# Patient Record
Sex: FEMALE | Race: WHITE | NOT HISPANIC OR LATINO
[De-identification: names, ages, dates, MRNs, and addresses within clinical notes are randomized per-mention and may not be internally consistent; named-entity substitution may affect disease eponyms.]

---

## 2019-10-10 ENCOUNTER — FORM ENCOUNTER (OUTPATIENT)
Age: 69
End: 2019-10-10

## 2020-04-23 ENCOUNTER — FORM ENCOUNTER (OUTPATIENT)
Age: 70
End: 2020-04-23

## 2020-10-15 ENCOUNTER — FORM ENCOUNTER (OUTPATIENT)
Age: 70
End: 2020-10-15

## 2021-02-20 PROBLEM — Z00.00 ENCOUNTER FOR PREVENTIVE HEALTH EXAMINATION: Status: ACTIVE | Noted: 2021-02-20

## 2021-04-22 DIAGNOSIS — Z86.79 PERSONAL HISTORY OF OTHER DISEASES OF THE CIRCULATORY SYSTEM: ICD-10-CM

## 2021-04-22 DIAGNOSIS — Z87.891 PERSONAL HISTORY OF NICOTINE DEPENDENCE: ICD-10-CM

## 2021-04-22 DIAGNOSIS — Z80.8 FAMILY HISTORY OF MALIGNANT NEOPLASM OF OTHER ORGANS OR SYSTEMS: ICD-10-CM

## 2021-04-22 DIAGNOSIS — Z86.39 PERSONAL HISTORY OF OTHER ENDOCRINE, NUTRITIONAL AND METABOLIC DISEASE: ICD-10-CM

## 2021-04-22 DIAGNOSIS — Z85.3 PERSONAL HISTORY OF MALIGNANT NEOPLASM OF BREAST: ICD-10-CM

## 2021-04-22 DIAGNOSIS — Z80.1 FAMILY HISTORY OF MALIGNANT NEOPLASM OF TRACHEA, BRONCHUS AND LUNG: ICD-10-CM

## 2021-04-23 ENCOUNTER — APPOINTMENT (OUTPATIENT)
Dept: BREAST CENTER | Facility: CLINIC | Age: 71
End: 2021-04-23
Payer: MEDICARE

## 2021-04-23 VITALS
HEIGHT: 58 IN | SYSTOLIC BLOOD PRESSURE: 172 MMHG | BODY MASS INDEX: 35.68 KG/M2 | DIASTOLIC BLOOD PRESSURE: 94 MMHG | WEIGHT: 170 LBS

## 2021-04-23 PROCEDURE — 99214 OFFICE O/P EST MOD 30 MIN: CPT

## 2021-04-23 RX ORDER — METFORMIN HYDROCHLORIDE 500 MG/1
500 TABLET, COATED ORAL
Refills: 0 | Status: ACTIVE | COMMUNITY

## 2021-04-23 RX ORDER — EMPAGLIFLOZIN 25 MG/1
25 TABLET, FILM COATED ORAL
Refills: 0 | Status: ACTIVE | COMMUNITY

## 2021-04-23 RX ORDER — EZETIMIBE 10 MG/1
10 TABLET ORAL
Refills: 0 | Status: ACTIVE | COMMUNITY

## 2021-04-23 RX ORDER — METOPROLOL TARTRATE 100 MG/1
100 TABLET, FILM COATED ORAL
Refills: 0 | Status: ACTIVE | COMMUNITY

## 2021-04-23 RX ORDER — ROSUVASTATIN CALCIUM 40 MG/1
40 TABLET, FILM COATED ORAL
Refills: 0 | Status: ACTIVE | COMMUNITY

## 2021-04-23 RX ORDER — LOSARTAN POTASSIUM 100 MG/1
100 TABLET, FILM COATED ORAL
Refills: 0 | Status: ACTIVE | COMMUNITY

## 2021-04-23 NOTE — HISTORY OF PRESENT ILLNESS
[FreeTextEntry1] : The patient is a 71-year-old G3, P2 postmenopausal white female of [place] descent.  She underwent menopause at age 57 and never took any hormone replacement therapy.  She underwent menarche at age 12 and had her first child at age 19.  She has a family history with her maternal aunts with breast cancer at age 70 and her father had brain and lung cancer at age 52.  The patient felt a mass in the right breast upper inner quadrant in October 2017 and underwent a mammography and ultrasound showing a suspicious spiculated mass measuring about 1.2 cm in the right breast 1:00 region 8 cm from the nipple.  She underwent an ultrasound-guided core biopsy on October 24, 2017 at [facility] which showed a 1 cm high-grade infiltrating ductal cancer which was ER positive at 90%, WV positive at 50%, HER-2/elissa negative by IHC with a Ki-67 of 20%.  She underwent a bilateral breast MRI on November 8, 2017 which showed a localized right breast cancer.  She underwent a right breast partial mastectomy and sentinel lymph node biopsy on December 11, 2017 and the final pathology showed 5 negative sentinel lymph nodes and the wide excision showed a 1.5 cm high-grade infiltrating ductal cancer with negative margins.  This was a pathologic prognostic stage IA breast cancer.  MammaPrint showed a high risk luminal type and she underwent AC-T chemotherapy with Dr. Stack which finished in May 2018.  She received external beam radiation which finished in July 2018 and was started on Arimidex in August 2018.  She comes in for routine follow-up and continues to get yearly mammography and ultrasound.

## 2021-04-23 NOTE — REASON FOR VISIT
[Follow-Up: _____] : a [unfilled] follow-up visit [FreeTextEntry1] : The patient comes in with a family history of breast cancer and a personal history of a right breast upper inner quadrant high-grade invasive duct cancer which was ER/OH positive HER-2/elissa negative for which she underwent a right breast partial mastectomy on December 11, 2017 and final pathology showed 5 negative sentinel lymph nodes with a 1.5 cm high-grade infiltrating ductal cancer.  This was a pathologic prognostic stage IA breast cancer.  MammaPrint showed a high risk luminal type and she underwent chemotherapy with Dr. Stack and received external beam radiation which finished in July 2018 and was started on Arimidex.

## 2021-04-23 NOTE — ASSESSMENT
[FreeTextEntry1] : The patient is a 71-year-old G3, P2 postmenopausal white female of [place] descent.  She underwent menopause at age 57 and never took any hormone replacement therapy.  She underwent menarche at age 12 and had her first child at age 19.  She has a family history with her maternal aunts with breast cancer at age 70 and her father had brain and lung cancer at age 52.  The patient felt a mass in the right breast upper inner quadrant in October 2017 and underwent a mammography and ultrasound showing a suspicious spiculated mass measuring about 1.2 cm in the right breast 1:00 region 8 cm from the nipple.  She underwent an ultrasound-guided core biopsy on October 24, 2017 at [facility] which showed a 1 cm high-grade infiltrating ductal cancer which was ER positive at 90%, NY positive at 50%, HER-2/elissa negative by IHC with a Ki-67 of 20%.  She underwent a bilateral breast MRI on November 8, 2017 which showed a localized right breast cancer.  She underwent a right breast partial mastectomy and sentinel lymph node biopsy on December 11, 2017 and the final pathology showed 5 negative sentinel lymph nodes and the wide excision showed a 1.5 cm high-grade infiltrating ductal cancer with negative margins.  This was a pathologic prognostic stage IA breast cancer.  MammaPrint showed a high risk luminal type and she underwent AC-T chemotherapy with Dr. Stack which finished in May 2018.  She received external beam radiation which finished in July 2018 and was started on Arimidex in August 2018.  On exam today she has no evidence of recurrence in the right breast and no suspicious findings felt in the left breast.  She underwent a bilateral mammography and ultrasound at [facility] on April 21, 2020 just showing postop changes to the right breast.  The patient was reassured and should follow-up again in 6 months in October 2021.  Her next bilateral mammography and ultrasound will be due in April 2022.  She will remain on Arimidex and follow-up with Dr. Walker.

## 2021-04-23 NOTE — PHYSICAL EXAM
[Normocephalic] : normocephalic [Atraumatic] : atraumatic [EOMI] : extra ocular movement intact [Supple] : supple [No Supraclavicular Adenopathy] : no supraclavicular adenopathy [No Cervical Adenopathy] : no cervical adenopathy [Examined in the supine and seated position] : examined in the supine and seated position [No dominant masses] : no dominant masses in right breast  [No dominant masses] : no dominant masses left breast [No Nipple Retraction] : no left nipple retraction [No Nipple Discharge] : no left nipple discharge [Breast Mass Right Breast ___cm] : no masses [Breast Mass Left Breast ___cm] : no masses [Breast Nipple Inversion] : nipples not inverted [Breast Nipple Retraction] : nipples not retracted [Breast Nipple Flattening] : nipples not flattened [Breast Nipple Fissures] : nipples not fissured [Breast Abnormal Lactation (Galactorrhea)] : no galactorrhea [Breast Abnormal Secretion Bloody Fluid] : no bloody discharge [Breast Abnormal Secretion Serous Fluid] : no serous discharge [Breast Abnormal Secretion Opalescent Fluid] : no milky discharge [No Axillary Lymphadenopathy] : no left axillary lymphadenopathy [No Edema] : no edema [No Rashes] : no rashes [No Ulceration] : no ulceration [de-identified] : Status post right breast partial mastectomy with scarring changes in the right breast 12:00 region but no evidence of recurrence. [de-identified] : On exam, the patient has ptotic C-cup breasts.  On palpation she has typical scarring changes toward the upper aspect of the right breast where she had her partial mastectomy but no evidence of recurrence.  She has no axillary, supraclavicular, or cervical adenopathy.

## 2021-04-23 NOTE — PAST MEDICAL HISTORY
[Postmenopausal] : The patient is postmenopausal [Menarche Age ____] : age at menarche was [unfilled] [Menopause Age____] : age at menopause was [unfilled] [Total Preg ___] : G[unfilled] [Live Births ___] : P[unfilled]  [Age At Live Birth ___] : Age at live birth: [unfilled] [History of Hormone Replacement Treatment] : has no history of hormone replacement treatment [de-identified] : bra size 42DD

## 2021-10-18 ENCOUNTER — APPOINTMENT (OUTPATIENT)
Dept: BREAST CENTER | Facility: CLINIC | Age: 71
End: 2021-10-18
Payer: MEDICARE

## 2021-10-18 VITALS — BODY MASS INDEX: 35.68 KG/M2 | HEIGHT: 58 IN | WEIGHT: 170 LBS

## 2021-10-18 PROCEDURE — 99213 OFFICE O/P EST LOW 20 MIN: CPT

## 2021-10-18 NOTE — HISTORY OF PRESENT ILLNESS
[FreeTextEntry1] : The patient is a 71-year-old G3, P2 postmenopausal white female of [place] descent.  She underwent menopause at age 57 and never took any hormone replacement therapy.  She underwent menarche at age 12 and had her first child at age 19.  She has a family history with her maternal aunts with breast cancer at age 70 and her father had brain and lung cancer at age 52.  The patient felt a mass in the right breast upper inner quadrant in October 2017 and underwent a mammography and ultrasound showing a suspicious spiculated mass measuring about 1.2 cm in the right breast 1:00 region 8 cm from the nipple.  She underwent an ultrasound-guided core biopsy on October 24, 2017 at [facility] which showed a 1 cm high-grade infiltrating ductal cancer which was ER positive at 90%, WI positive at 50%, HER-2/elissa negative by IHC with a Ki-67 of 20%.  She underwent a bilateral breast MRI on November 8, 2017 which showed a localized right breast cancer.  She underwent a right breast partial mastectomy and sentinel lymph node biopsy on December 11, 2017 and the final pathology showed 5 negative sentinel lymph nodes and the wide excision showed a 1.5 cm high-grade infiltrating ductal cancer with negative margins.  This was a pathologic prognostic stage IA breast cancer.  MammaPrint showed a high risk luminal type and she underwent AC-T chemotherapy with Dr. Stack which finished in May 2018.  She received external beam radiation which finished in July 2018 and was started on Arimidex in August 2018.  She comes in for routine follow-up and continues to get yearly mammography and ultrasound.

## 2021-10-18 NOTE — REASON FOR VISIT
[Follow-Up: _____] : a [unfilled] follow-up visit [FreeTextEntry1] : The patient comes in with a family history of breast cancer and a personal history of a right breast upper inner quadrant high-grade invasive duct cancer which was ER/MS positive HER-2/elissa negative for which she underwent a right breast partial mastectomy on December 11, 2017 and final pathology showed 5 negative sentinel lymph nodes with a 1.5 cm high-grade infiltrating ductal cancer.  This was a pathologic prognostic stage IA breast cancer.  MammaPrint showed a high risk luminal type and she underwent chemotherapy with Dr. Stack and received external beam radiation which finished in July 2018 and was started on Arimidex.

## 2021-10-18 NOTE — PHYSICAL EXAM
[Normocephalic] : normocephalic [Atraumatic] : atraumatic [EOMI] : extra ocular movement intact [Supple] : supple [No Supraclavicular Adenopathy] : no supraclavicular adenopathy [No Cervical Adenopathy] : no cervical adenopathy [Examined in the supine and seated position] : examined in the supine and seated position [No dominant masses] : no dominant masses in right breast  [No dominant masses] : no dominant masses left breast [No Nipple Retraction] : no left nipple retraction [No Nipple Discharge] : no left nipple discharge [Breast Mass Right Breast ___cm] : no masses [Breast Mass Left Breast ___cm] : no masses [No Axillary Lymphadenopathy] : no left axillary lymphadenopathy [No Edema] : no edema [No Rashes] : no rashes [No Ulceration] : no ulceration [Breast Nipple Inversion] : nipples not inverted [Breast Nipple Retraction] : nipples not retracted [Breast Nipple Flattening] : nipples not flattened [Breast Nipple Fissures] : nipples not fissured [Breast Abnormal Lactation (Galactorrhea)] : no galactorrhea [Breast Abnormal Secretion Bloody Fluid] : no bloody discharge [Breast Abnormal Secretion Serous Fluid] : no serous discharge [Breast Abnormal Secretion Opalescent Fluid] : no milky discharge [de-identified] : On exam, the patient has ptotic C-cup breasts.  On palpation she has typical scarring changes toward the upper aspect of the right breast where she had her partial mastectomy but no evidence of recurrence.  She has no axillary, supraclavicular, or cervical adenopathy. [de-identified] : Status post right breast partial mastectomy with scarring changes in the right breast 12:00 region but no evidence of recurrence.

## 2022-06-08 ENCOUNTER — APPOINTMENT (OUTPATIENT)
Dept: BREAST CENTER | Facility: CLINIC | Age: 72
End: 2022-06-08
Payer: MEDICARE

## 2022-06-08 ENCOUNTER — NON-APPOINTMENT (OUTPATIENT)
Age: 72
End: 2022-06-08

## 2022-06-08 ENCOUNTER — TRANSCRIPTION ENCOUNTER (OUTPATIENT)
Age: 72
End: 2022-06-08

## 2022-06-08 VITALS
SYSTOLIC BLOOD PRESSURE: 144 MMHG | HEART RATE: 100 BPM | BODY MASS INDEX: 35.53 KG/M2 | WEIGHT: 170 LBS | OXYGEN SATURATION: 98 % | DIASTOLIC BLOOD PRESSURE: 87 MMHG

## 2022-06-08 PROCEDURE — 99213 OFFICE O/P EST LOW 20 MIN: CPT

## 2022-06-08 NOTE — REASON FOR VISIT
[Follow-Up: _____] : a [unfilled] follow-up visit [FreeTextEntry1] : The patient comes in with a family history of breast cancer and a personal history of a right breast upper inner quadrant high-grade invasive duct cancer which was ER/UT positive HER-2/elissa negative for which she underwent a right breast partial mastectomy on December 11, 2017 and final pathology showed 5 negative sentinel lymph nodes with a 1.5 cm high-grade infiltrating ductal cancer.  This was a pathologic prognostic stage IA breast cancer.  MammaPrint showed a high risk luminal type and she underwent chemotherapy with Dr. Stack and received external beam radiation which finished in July 2018 and was started on Arimidex.

## 2022-06-08 NOTE — PAST MEDICAL HISTORY
[Postmenopausal] : The patient is postmenopausal [Menarche Age ____] : age at menarche was [unfilled] [Menopause Age____] : age at menopause was [unfilled] [Total Preg ___] : G[unfilled] [Live Births ___] : P[unfilled]  [Age At Live Birth ___] : Age at live birth: [unfilled] [History of Hormone Replacement Treatment] : has no history of hormone replacement treatment [de-identified] : bra size 42DD

## 2022-06-08 NOTE — ASSESSMENT
[FreeTextEntry1] : The patient is a 72-year-old G3, P2 postmenopausal white female of [place] descent.  She underwent menopause at age 57 and never took any hormone replacement therapy.  She underwent menarche at age 12 and had her first child at age 19.  She has a family history with her maternal aunts with breast cancer at age 70 and her father had brain and lung cancer at age 52.  The patient felt a mass in the right breast upper inner quadrant in October 2017 and underwent a mammography and ultrasound showing a suspicious spiculated mass measuring about 1.2 cm in the right breast 1:00 region 8 cm from the nipple.  She underwent an ultrasound-guided core biopsy on October 24, 2017 at [facility] which showed a 1 cm high-grade infiltrating ductal cancer which was ER positive at 90%, TX positive at 50%, HER-2/elissa negative by IHC with a Ki-67 of 20%.  She underwent a bilateral breast MRI on November 8, 2017 which showed a localized right breast cancer.  She underwent a right breast partial mastectomy and sentinel lymph node biopsy on December 11, 2017 and the final pathology showed 5 negative sentinel lymph nodes and the wide excision showed a 1.5 cm high-grade infiltrating ductal cancer with negative margins.  This was a pathologic prognostic stage IA breast cancer.  MammaPrint showed a high risk luminal type and she underwent AC-T chemotherapy with Dr. Stack which finished in May 2018.  She received external beam radiation which finished in July 2018 and was started on Arimidex in August 2018.  On exam today, she has no evidence of recurrence in the right breast and no suspicious findings felt in the left breast.  She underwent her last bilateral mammography and ultrasound which was reviewed from April 27, 2022 performed at [facility] just showing postop changes to the right breast.  The patient was reassured and should follow-up again in 6 months in December 2022.  She will remain on Arimidex and follow-up with Dr. Walker.  Her next bilateral mammography and ultrasound will be due in April 2023 and she can start following up yearly after her next visit since she will be 5 years postop.

## 2022-06-08 NOTE — HISTORY OF PRESENT ILLNESS
[FreeTextEntry1] : The patient is a 72-year-old G3, P2 postmenopausal white female of [place] descent.  She underwent menopause at age 57 and never took any hormone replacement therapy.  She underwent menarche at age 12 and had her first child at age 19.  She has a family history with her maternal aunts with breast cancer at age 70 and her father had brain and lung cancer at age 52.  The patient felt a mass in the right breast upper inner quadrant in October 2017 and underwent a mammography and ultrasound showing a suspicious spiculated mass measuring about 1.2 cm in the right breast 1:00 region 8 cm from the nipple.  She underwent an ultrasound-guided core biopsy on October 24, 2017 at [facility] which showed a 1 cm high-grade infiltrating ductal cancer which was ER positive at 90%, NE positive at 50%, HER-2/elissa negative by IHC with a Ki-67 of 20%.  She underwent a bilateral breast MRI on November 8, 2017 which showed a localized right breast cancer.  She underwent a right breast partial mastectomy and sentinel lymph node biopsy on December 11, 2017 and the final pathology showed 5 negative sentinel lymph nodes and the wide excision showed a 1.5 cm high-grade infiltrating ductal cancer with negative margins.  This was a pathologic prognostic stage IA breast cancer.  MammaPrint showed a high risk luminal type and she underwent AC-T chemotherapy with Dr. Stack which finished in May 2018.  She received external beam radiation which finished in July 2018 and was started on Arimidex in August 2018.  She comes in for routine follow-up and continues to get yearly mammography and ultrasound.

## 2022-06-08 NOTE — PHYSICAL EXAM
[Normocephalic] : normocephalic [Atraumatic] : atraumatic [EOMI] : extra ocular movement intact [Supple] : supple [No Supraclavicular Adenopathy] : no supraclavicular adenopathy [No Cervical Adenopathy] : no cervical adenopathy [Examined in the supine and seated position] : examined in the supine and seated position [No dominant masses] : no dominant masses in right breast  [No dominant masses] : no dominant masses left breast [No Nipple Retraction] : no left nipple retraction [No Nipple Discharge] : no left nipple discharge [Breast Mass Right Breast ___cm] : no masses [Breast Mass Left Breast ___cm] : no masses [No Axillary Lymphadenopathy] : no left axillary lymphadenopathy [No Edema] : no edema [No Rashes] : no rashes [No Ulceration] : no ulceration [Breast Nipple Inversion] : nipples not inverted [Breast Nipple Retraction] : nipples not retracted [Breast Nipple Flattening] : nipples not flattened [Breast Nipple Fissures] : nipples not fissured [Breast Abnormal Lactation (Galactorrhea)] : no galactorrhea [Breast Abnormal Secretion Bloody Fluid] : no bloody discharge [Breast Abnormal Secretion Serous Fluid] : no serous discharge [Breast Abnormal Secretion Opalescent Fluid] : no milky discharge [de-identified] : On exam, the patient has ptotic C-cup breasts.  On palpation she has typical scarring changes toward the upper aspect of the right breast where she had her partial mastectomy but no evidence of recurrence.  She has no axillary, supraclavicular, or cervical adenopathy. [de-identified] : Status post right breast partial mastectomy with scarring changes in the right breast 11:00-12:00 region but no evidence of recurrence.

## 2022-12-08 NOTE — PAST MEDICAL HISTORY
[Postmenopausal] : The patient is postmenopausal [Menarche Age ____] : age at menarche was [unfilled] [Menopause Age____] : age at menopause was [unfilled] [History of Hormone Replacement Treatment] : has no history of hormone replacement treatment [Total Preg ___] : G[unfilled] [Live Births ___] : P[unfilled]  [Age At Live Birth ___] : Age at live birth: [unfilled] [de-identified] : bra size 42DD

## 2022-12-08 NOTE — ASSESSMENT
[FreeTextEntry1] : The patient is a 72-year-old G3, P2 postmenopausal white female of [place] descent.  She underwent menopause at age 57 and never took any hormone replacement therapy.  She underwent menarche at age 12 and had her first child at age 19.  She has a family history with her maternal aunts with breast cancer at age 70 and her father had brain and lung cancer at age 52.  The patient felt a mass in the right breast upper inner quadrant in October 2017 and underwent a mammography and ultrasound showing a suspicious spiculated mass measuring about 1.2 cm in the right breast 1:00 region 8 cm from the nipple.  She underwent an ultrasound-guided core biopsy on October 24, 2017 at [facility] which showed a 1 cm high-grade infiltrating ductal cancer which was ER positive at 90%, OH positive at 50%, HER-2/elissa negative by IHC with a Ki-67 of 20%.  She underwent a bilateral breast MRI on November 8, 2017 which showed a localized right breast cancer.  She underwent a right breast partial mastectomy and sentinel lymph node biopsy on December 11, 2017 and the final pathology showed 5 negative sentinel lymph nodes and the wide excision showed a 1.5 cm high-grade infiltrating ductal cancer with negative margins.  This was a pathologic prognostic stage IA breast cancer.  MammaPrint showed a high risk luminal type and she underwent AC-T chemotherapy with Dr. Stack which finished in May 2018.  She received external beam radiation which finished in July 2018 and was started on Arimidex in August 2018.  On exam today, she has no evidence of recurrence in the right breast and no suspicious findings felt in the left breast.  She underwent her last bilateral mammography and ultrasound which was reviewed from April 27, 2022 performed at [facility] just showing postop changes to the right breast.  The patient was reassured and should follow-up again in 1 year in December 2023.  She will remain on Arimidex and follow-up with  ?????? Aaron.  Her next bilateral mammography and ultrasound will be due in April 2023 and she was given prescriptions.

## 2022-12-08 NOTE — PHYSICAL EXAM
[Normocephalic] : normocephalic [Atraumatic] : atraumatic [EOMI] : extra ocular movement intact [Supple] : supple [No Supraclavicular Adenopathy] : no supraclavicular adenopathy [No Cervical Adenopathy] : no cervical adenopathy [Examined in the supine and seated position] : examined in the supine and seated position [No dominant masses] : no dominant masses in right breast  [No dominant masses] : no dominant masses left breast [No Nipple Retraction] : no left nipple retraction [No Nipple Discharge] : no left nipple discharge [Breast Mass Right Breast ___cm] : no masses [Breast Mass Left Breast ___cm] : no masses [Breast Nipple Inversion] : nipples not inverted [Breast Nipple Retraction] : nipples not retracted [Breast Nipple Flattening] : nipples not flattened [Breast Nipple Fissures] : nipples not fissured [Breast Abnormal Lactation (Galactorrhea)] : no galactorrhea [Breast Abnormal Secretion Bloody Fluid] : no bloody discharge [Breast Abnormal Secretion Serous Fluid] : no serous discharge [Breast Abnormal Secretion Opalescent Fluid] : no milky discharge [No Axillary Lymphadenopathy] : no left axillary lymphadenopathy [No Edema] : no edema [No Rashes] : no rashes [No Ulceration] : no ulceration [de-identified] : On exam, the patient has ptotic C-cup breasts.  On palpation she has typical scarring changes toward the upper aspect of the right breast where she had her partial mastectomy but no evidence of recurrence.  She has no axillary, supraclavicular, or cervical adenopathy. [de-identified] : Status post right breast partial mastectomy with scarring changes in the right breast 11:00-12:00 region but no evidence of recurrence.

## 2022-12-08 NOTE — HISTORY OF PRESENT ILLNESS
[FreeTextEntry1] : The patient is a 72-year-old G3, P2 postmenopausal white female of [place] descent.  She underwent menopause at age 57 and never took any hormone replacement therapy.  She underwent menarche at age 12 and had her first child at age 19.  She has a family history with her maternal aunts with breast cancer at age 70 and her father had brain and lung cancer at age 52.  The patient felt a mass in the right breast upper inner quadrant in October 2017 and underwent a mammography and ultrasound showing a suspicious spiculated mass measuring about 1.2 cm in the right breast 1:00 region 8 cm from the nipple.  She underwent an ultrasound-guided core biopsy on October 24, 2017 at [facility] which showed a 1 cm high-grade infiltrating ductal cancer which was ER positive at 90%, [place] positive at 50%, HER-2/elissa negative by IHC with a Ki-67 of 20%.  She underwent a bilateral breast MRI on November 8, 2017 which showed a localized right breast cancer.  She underwent a right breast partial mastectomy and sentinel lymph node biopsy on December 11, 2017 and the final pathology showed 5 negative sentinel lymph nodes and the wide excision showed a 1.5 cm high-grade infiltrating ductal cancer with negative margins.  This was a pathologic prognostic stage IA breast cancer.  MammaPrint showed a high risk luminal type and she underwent AC-T chemotherapy with Dr. Stack which finished in May 2018.  She received external beam radiation which finished in July 2018 and was started on Arimidex in August 2018.  She comes in for routine follow-up and continues to get yearly mammography and ultrasound.

## 2022-12-08 NOTE — REASON FOR VISIT
[Follow-Up: _____] : a [unfilled] follow-up visit [FreeTextEntry1] : The patient comes in with a family history of breast cancer and a personal history of a right breast upper inner quadrant high-grade invasive duct cancer which was ER/AL positive HER-2/elissa negative for which she underwent a right breast partial mastectomy on December 11, 2017 and final pathology showed 5 negative sentinel lymph nodes with a 1.5 cm high-grade infiltrating ductal cancer.  This was a pathologic prognostic stage IA breast cancer.  MammaPrint showed a high risk luminal type and she underwent chemotherapy with Dr. Stack and received external beam radiation which finished in July 2018 and was started on Arimidex.

## 2022-12-14 ENCOUNTER — APPOINTMENT (OUTPATIENT)
Dept: BREAST CENTER | Facility: CLINIC | Age: 72
End: 2022-12-14
Payer: MEDICARE

## 2023-01-11 ENCOUNTER — APPOINTMENT (OUTPATIENT)
Dept: BREAST CENTER | Facility: CLINIC | Age: 73
End: 2023-01-11
Payer: MEDICARE

## 2023-01-11 VITALS
SYSTOLIC BLOOD PRESSURE: 152 MMHG | OXYGEN SATURATION: 94 % | DIASTOLIC BLOOD PRESSURE: 79 MMHG | BODY MASS INDEX: 35.53 KG/M2 | HEART RATE: 74 BPM | WEIGHT: 170 LBS

## 2023-01-11 PROCEDURE — 99213 OFFICE O/P EST LOW 20 MIN: CPT

## 2023-01-11 NOTE — REASON FOR VISIT
[Follow-Up: _____] : a [unfilled] follow-up visit [FreeTextEntry1] : The patient comes in with a family history of breast cancer and a personal history of a right breast upper inner quadrant high-grade invasive duct cancer which was ER/[place] positive HER-2/elissa negative for which she underwent a right breast partial mastectomy on December 11, 2017 and final pathology showed 5 negative sentinel lymph nodes with a 1.5 cm high-grade infiltrating ductal cancer.  This was a pathologic prognostic stage IA breast cancer.  MammaPrint showed a high risk luminal type and she underwent chemotherapy with Dr. Stack and received external beam radiation which finished in July 2018 and was started on Arimidex.

## 2023-01-11 NOTE — ASSESSMENT
[FreeTextEntry1] : The patient is a 72-year-old G3, P2 postmenopausal white female of [place] descent.  She underwent menopause at age 57 and never took any hormone replacement therapy.  She underwent menarche at age 12 and had her first child at age 19.  She has a family history with her maternal aunts with breast cancer at age 70 and her father had brain and lung cancer at age 52.  The patient felt a mass in the right breast upper inner quadrant in October 2017 and underwent a mammography and ultrasound showing a suspicious spiculated mass measuring about 1.2 cm in the right breast 1:00 region 8 cm from the nipple.  She underwent an ultrasound-guided core biopsy on October 24, 2017 at [facility] which showed a 1 cm high-grade infiltrating ductal cancer which was ER positive at 90%, VA positive at 50%, HER-2/elissa negative by IHC with a Ki-67 of 20%.  She underwent a bilateral breast MRI on November 8, 2017 which showed a localized right breast cancer.  She underwent a right breast partial mastectomy and sentinel lymph node biopsy on December 11, 2017 and the final pathology showed 5 negative sentinel lymph nodes and the wide excision showed a 1.5 cm high-grade infiltrating ductal cancer with negative margins.  This was a pathologic prognostic stage IA breast cancer.  MammaPrint showed a high risk luminal type and she underwent AC-T chemotherapy with Dr. Stack which finished in May 2018.  She received external beam radiation which finished in July 2018 and was started on Arimidex in August 2018.  On exam today, she has no evidence of recurrence in the right breast and no suspicious findings felt in the left breast.  She underwent her last bilateral mammography and ultrasound which was reviewed from April 27, 2022 performed at [facility] just showing postop changes to the right breast.  The patient was reassured and she can follow-up again in 1 year in January 2024 but she would like to follow-up after her next mammo and ultrasound and then yearly afterwards.  She will remain on Arimidex and follow-up with Dr. Hu.  Her next bilateral mammography and ultrasound will be due in April 2023 and she was given prescriptions.

## 2023-01-11 NOTE — PHYSICAL EXAM
[Normocephalic] : normocephalic [Atraumatic] : atraumatic [EOMI] : extra ocular movement intact [Supple] : supple [No Supraclavicular Adenopathy] : no supraclavicular adenopathy [No Cervical Adenopathy] : no cervical adenopathy [Examined in the supine and seated position] : examined in the supine and seated position [No dominant masses] : no dominant masses in right breast  [No dominant masses] : no dominant masses left breast [No Nipple Retraction] : no left nipple retraction [No Nipple Discharge] : no left nipple discharge [Breast Mass Right Breast ___cm] : no masses [Breast Mass Left Breast ___cm] : no masses [No Axillary Lymphadenopathy] : no left axillary lymphadenopathy [No Edema] : no edema [No Rashes] : no rashes [No Ulceration] : no ulceration [Breast Nipple Inversion] : nipples not inverted [Breast Nipple Retraction] : nipples not retracted [Breast Nipple Flattening] : nipples not flattened [Breast Nipple Fissures] : nipples not fissured [Breast Abnormal Lactation (Galactorrhea)] : no galactorrhea [Breast Abnormal Secretion Bloody Fluid] : no bloody discharge [Breast Abnormal Secretion Serous Fluid] : no serous discharge [Breast Abnormal Secretion Opalescent Fluid] : no milky discharge [de-identified] : On exam, the patient has ptotic C-cup breasts.  On palpation she has typical scarring changes toward the upper aspect of the right breast where she had her partial mastectomy but no evidence of recurrence.  She has no axillary, supraclavicular, or cervical adenopathy. [de-identified] : Status post right breast partial mastectomy with scarring changes in the right breast 11:00-12:00 region but no evidence of recurrence.

## 2023-01-11 NOTE — HISTORY OF PRESENT ILLNESS
[FreeTextEntry1] : The patient is a 72-year-old G3, P2 postmenopausal white female of [place] descent.  She underwent menopause at age 57 and never took any hormone replacement therapy.  She underwent menarche at age 12 and had her first child at age 19.  She has a family history with her maternal aunts with breast cancer at age 70 and her father had brain and lung cancer at age 52.  The patient felt a mass in the right breast upper inner quadrant in October 2017 and underwent a mammography and ultrasound showing a suspicious spiculated mass measuring about 1.2 cm in the right breast 1:00 region 8 cm from the nipple.  She underwent an ultrasound-guided core biopsy on October 24, 2017 at [facility] which showed a 1 cm high-grade infiltrating ductal cancer which was ER positive at 90%, NC positive at 50%, HER-2/elissa negative by IHC with a Ki-67 of 20%.  She underwent a bilateral breast MRI on November 8, 2017 which showed a localized right breast cancer.  She underwent a right breast partial mastectomy and sentinel lymph node biopsy on December 11, 2017 and the final pathology showed 5 negative sentinel lymph nodes and the wide excision showed a 1.5 cm high-grade infiltrating ductal cancer with negative margins.  This was a pathologic prognostic stage IA breast cancer.  MammaPrint showed a high risk luminal type and she underwent AC-T chemotherapy with Dr. Stack which finished in May 2018.  She received external beam radiation which finished in July 2018 and was started on Arimidex in August 2018.  She comes in for routine follow-up and continues to get yearly mammography and ultrasound.

## 2023-01-11 NOTE — PAST MEDICAL HISTORY
[Postmenopausal] : The patient is postmenopausal [Menarche Age ____] : age at menarche was [unfilled] [Menopause Age____] : age at menopause was [unfilled] [Total Preg ___] : G[unfilled] [Live Births ___] : P[unfilled]  [Age At Live Birth ___] : Age at live birth: [unfilled] [History of Hormone Replacement Treatment] : has no history of hormone replacement treatment [de-identified] : bra size 42DD

## 2023-05-03 ENCOUNTER — RESULT REVIEW (OUTPATIENT)
Age: 73
End: 2023-05-03

## 2023-05-04 ENCOUNTER — APPOINTMENT (OUTPATIENT)
Dept: BREAST CENTER | Facility: CLINIC | Age: 73
End: 2023-05-04
Payer: MEDICARE

## 2023-05-04 VITALS
HEIGHT: 58 IN | BODY MASS INDEX: 35.48 KG/M2 | OXYGEN SATURATION: 94 % | SYSTOLIC BLOOD PRESSURE: 92 MMHG | HEART RATE: 68 BPM | WEIGHT: 169 LBS | DIASTOLIC BLOOD PRESSURE: 67 MMHG

## 2023-05-04 DIAGNOSIS — N64.89 OTHER SPECIFIED DISORDERS OF BREAST: ICD-10-CM

## 2023-05-04 PROCEDURE — 99213 OFFICE O/P EST LOW 20 MIN: CPT

## 2023-05-04 NOTE — PAST MEDICAL HISTORY
[Postmenopausal] : The patient is postmenopausal [Menarche Age ____] : age at menarche was [unfilled] [Menopause Age____] : age at menopause was [unfilled] [Total Preg ___] : G[unfilled] [Live Births ___] : P[unfilled]  [Age At Live Birth ___] : Age at live birth: [unfilled] [History of Hormone Replacement Treatment] : has no history of hormone replacement treatment [de-identified] : bra size 42DD

## 2023-05-04 NOTE — REASON FOR VISIT
[Follow-Up: _____] : a [unfilled] follow-up visit [FreeTextEntry1] : The patient comes in with a family history of breast cancer and a personal history of a right breast upper inner quadrant high-grade invasive duct cancer which was ER/KY positive HER-2/elissa negative for which she underwent a right breast partial mastectomy on December 11, 2017 and final pathology showed 5 negative sentinel lymph nodes with a 1.5 cm high-grade infiltrating ductal cancer.  This was a pathologic prognostic stage IA breast cancer.  MammaPrint showed a high risk luminal type and she underwent chemotherapy with Dr. Stack and received external beam radiation which finished in July 2018 and was started on Arimidex.

## 2023-05-04 NOTE — ASSESSMENT
[FreeTextEntry1] : The patient is a 73-year-old G3, P2 postmenopausal white female of [place] descent.  She underwent menopause at age 57 and never took any hormone replacement therapy.  She underwent menarche at age 12 and had her first child at age 19.  She has a family history with her maternal aunts with breast cancer at age 70 and her father had brain and lung cancer at age 52.  The patient felt a mass in the right breast upper inner quadrant in October 2017 and underwent a mammography and ultrasound showing a suspicious spiculated mass measuring about 1.2 cm in the right breast 1:00 region 8 cm from the nipple.  She underwent an ultrasound-guided core biopsy on October 24, 2017 at [facility] which showed a 1 cm high-grade infiltrating ductal cancer which was ER positive at 90%, SD positive at 50%, HER-2/elissa negative by IHC with a Ki-67 of 20%.  She underwent a bilateral breast MRI on November 8, 2017 which showed a localized right breast cancer.  She underwent a right breast partial mastectomy and sentinel lymph node biopsy on December 11, 2017 and the final pathology showed 5 negative sentinel lymph nodes and the wide excision showed a 1.5 cm high-grade infiltrating ductal cancer with negative margins.  This was a pathologic prognostic stage IA breast cancer.  MammaPrint showed a high risk luminal type and she underwent AC-T chemotherapy with Dr. Stack which finished in May 2018.  She received external beam radiation which finished in July 2018 and was started on Arimidex in August 2018.  On exam today, she has no evidence of recurrence in the right breast and no suspicious findings felt in the left breast.  She underwent her last bilateral mammography and ultrasound which was reviewed from May 4, 2023 performed at [facility] just showing postop changes to the right breast.  The patient was reassured and she can follow-up again in 1 year in May 2024.  She will remain on Arimidex and follow-up with Dr. Hu.  Her next bilateral mammography and ultrasound will be due in May 2024 and she was given prescriptions.

## 2023-05-04 NOTE — HISTORY OF PRESENT ILLNESS
[FreeTextEntry1] : The patient is a 73-year-old G3, P2 postmenopausal white female of [place] descent.  She underwent menopause at age 57 and never took any hormone replacement therapy.  She underwent menarche at age 12 and had her first child at age 19.  She has a family history with her maternal aunts with breast cancer at age 70 and her father had brain and lung cancer at age 52.  The patient felt a mass in the right breast upper inner quadrant in October 2017 and underwent a mammography and ultrasound showing a suspicious spiculated mass measuring about 1.2 cm in the right breast 1:00 region 8 cm from the nipple.  She underwent an ultrasound-guided core biopsy on October 24, 2017 at [facility] which showed a 1 cm high-grade infiltrating ductal cancer which was ER positive at 90%, NH positive at 50%, HER-2/elissa negative by IHC with a Ki-67 of 20%.  She underwent a bilateral breast MRI on November 8, 2017 which showed a localized right breast cancer.  She underwent a right breast partial mastectomy and sentinel lymph node biopsy on December 11, 2017 and the final pathology showed 5 negative sentinel lymph nodes and the wide excision showed a 1.5 cm high-grade infiltrating ductal cancer with negative margins.  This was a pathologic prognostic stage IA breast cancer.  MammaPrint showed a high risk luminal type and she underwent AC-T chemotherapy with Dr. Stack which finished in May 2018.  She received external beam radiation which finished in July 2018 and was started on Arimidex in August 2018.  She comes in for routine follow-up and continues to get yearly mammography and ultrasound.

## 2024-04-18 ENCOUNTER — NON-APPOINTMENT (OUTPATIENT)
Age: 74
End: 2024-04-18

## 2024-05-07 ENCOUNTER — RESULT REVIEW (OUTPATIENT)
Age: 74
End: 2024-05-07

## 2024-05-15 NOTE — PHYSICAL EXAM
[Breast Nipple Retraction] : nipples not retracted [Breast Nipple Inversion] : nipples not inverted [Breast Nipple Flattening] : nipples not flattened [Breast Nipple Fissures] : nipples not fissured [Breast Abnormal Lactation (Galactorrhea)] : no galactorrhea [Breast Abnormal Secretion Bloody Fluid] : no bloody discharge [Breast Abnormal Secretion Serous Fluid] : no serous discharge [Breast Abnormal Secretion Opalescent Fluid] : no milky discharge [de-identified] : On exam, the patient has ptotic C-cup breasts right breast is noticeably smaller than the left secondary to the partial mastectomy and radiation.  On palpation she has typical scarring changes toward the upper aspect of the right breast where she had her partial mastectomy but no evidence of recurrence.  She has no axillary, supraclavicular, or cervical adenopathy. [de-identified] : Status post right breast partial mastectomy with scarring changes in the right breast 11:00-12:00 region but no evidence of recurrence.

## 2024-05-15 NOTE — REASON FOR VISIT
[FreeTextEntry1] : The patient comes in with a family history of breast cancer and a personal history of a right breast upper inner quadrant high-grade invasive duct cancer which was ER/DC positive HER-2/elissa negative for which she underwent a right breast partial mastectomy on December 11, 2017 and final pathology showed 5 negative sentinel lymph nodes with a 1.5 cm high-grade infiltrating ductal cancer.  This was a pathologic prognostic stage IA breast cancer.  MammaPrint showed a high risk luminal type and she underwent chemotherapy with Dr. Stack and received external beam radiation which finished in July 2018 and was started on Arimidex.

## 2024-05-15 NOTE — HISTORY OF PRESENT ILLNESS
[FreeTextEntry1] : The patient is a 74-year-old G3, P2 postmenopausal white female of [place] descent.  She underwent menopause at age 57 and never took any hormone replacement therapy.  She underwent menarche at age 12 and had her first child at age 19.  She has a family history with her maternal aunts with breast cancer at age 70 and her father had brain and lung cancer at age 52.  The patient felt a mass in the right breast upper inner quadrant in October 2017 and underwent a mammography and ultrasound showing a suspicious spiculated mass measuring about 1.2 cm in the right breast 1:00 region 8 cm from the nipple.  She underwent an ultrasound-guided core biopsy on October 24, 2017 at [facility] which showed a 1 cm high-grade infiltrating ductal cancer which was ER positive at 90%, AK positive at 50%, HER-2/elissa negative by IHC with a Ki-67 of 20%.  She underwent a bilateral breast MRI on November 8, 2017 which showed a localized right breast cancer.  She underwent a right breast partial mastectomy and sentinel lymph node biopsy on December 11, 2017 and the final pathology showed 5 negative sentinel lymph nodes and the wide excision showed a 1.5 cm high-grade infiltrating ductal cancer with negative margins.  This was a pathologic prognostic stage IA breast cancer.  MammaPrint showed a high risk luminal type and she underwent AC-T chemotherapy with Dr. Stack which finished in May 2018.  She received external beam radiation which finished in July 2018 and was started on Arimidex in August 2018.  She comes in for routine follow-up and continues to get yearly mammography and ultrasound.

## 2024-05-15 NOTE — PAST MEDICAL HISTORY
[History of Hormone Replacement Treatment] : has no history of hormone replacement treatment [de-identified] : bra size 42DD

## 2024-05-15 NOTE — ASSESSMENT
[FreeTextEntry1] : The patient is a 74-year-old G3, P2 postmenopausal white female of [place] descent.  She underwent menopause at age 57 and never took any hormone replacement therapy.  She underwent menarche at age 12 and had her first child at age 19.  She has a family history with her maternal aunt with breast cancer at age 70 and her father had brain and lung cancer at age 52.  The patient felt a mass in the right breast upper inner quadrant in October 2017 and underwent a mammography and ultrasound showing a suspicious spiculated mass measuring about 1.2 cm in the right breast 1:00 region 8 cm from the nipple.  She underwent an ultrasound-guided core biopsy on October 24, 2017 at [facility] which showed a 1 cm high-grade infiltrating ductal cancer which was ER positive at 90%, WV positive at 50%, HER-2/elissa negative by IHC with a Ki-67 of 20%.  She underwent a bilateral breast MRI on November 8, 2017 which showed a localized right breast cancer.  She underwent a right breast partial mastectomy and sentinel lymph node biopsy on December 11, 2017 and the final pathology showed 5 negative sentinel lymph nodes and the wide excision showed a 1.5 cm high-grade infiltrating ductal cancer with negative margins.  This was a pathologic prognostic stage IA breast cancer.  MammaPrint showed a high risk luminal type and she underwent AC-T chemotherapy with Dr. Stack which finished in May 2018.  She received external beam radiation which finished in July 2018 and was started on Arimidex in August 2018.  On exam today, she has no evidence of recurrence in the right breast and no suspicious findings felt in the left breast.  She underwent her last bilateral mammography and ultrasound which was reviewed from ?????? May 4, 2023 performed at [facility] just showing postop changes to the right breast.  The patient was reassured and she can follow-up again in 1 year in May 2025.  She will remain on Arimidex and follow-up with Dr. Hu.  Her next bilateral mammography and ultrasound will be due in ?????? May 2025 and she was given prescriptions.
DISPLAY PLAN FREE TEXT

## 2024-05-22 ENCOUNTER — APPOINTMENT (OUTPATIENT)
Dept: BREAST CENTER | Facility: CLINIC | Age: 74
End: 2024-05-22
Payer: MEDICARE

## 2024-05-22 VITALS
HEIGHT: 58 IN | SYSTOLIC BLOOD PRESSURE: 119 MMHG | BODY MASS INDEX: 35.05 KG/M2 | OXYGEN SATURATION: 93 % | HEART RATE: 74 BPM | DIASTOLIC BLOOD PRESSURE: 74 MMHG | WEIGHT: 167 LBS

## 2024-05-22 DIAGNOSIS — R92.30 DENSE BREASTS, UNSPECIFIED: ICD-10-CM

## 2024-05-22 DIAGNOSIS — N60.19 DIFFUSE CYSTIC MASTOPATHY OF UNSPECIFIED BREAST: ICD-10-CM

## 2024-05-22 DIAGNOSIS — Z80.3 FAMILY HISTORY OF MALIGNANT NEOPLASM OF BREAST: ICD-10-CM

## 2024-05-22 DIAGNOSIS — Z12.31 ENCOUNTER FOR SCREENING MAMMOGRAM FOR MALIGNANT NEOPLASM OF BREAST: ICD-10-CM

## 2024-05-22 DIAGNOSIS — Z90.11 ACQUIRED ABSENCE OF RIGHT BREAST AND NIPPLE: ICD-10-CM

## 2024-05-22 DIAGNOSIS — Z85.3 PERSONAL HISTORY OF MALIGNANT NEOPLASM OF BREAST: ICD-10-CM

## 2024-05-22 PROCEDURE — 99213 OFFICE O/P EST LOW 20 MIN: CPT

## 2024-05-22 PROCEDURE — G2211 COMPLEX E/M VISIT ADD ON: CPT

## 2024-05-22 NOTE — PHYSICAL EXAM
[Normocephalic] : normocephalic [Atraumatic] : atraumatic [EOMI] : extra ocular movement intact [Supple] : supple [No Supraclavicular Adenopathy] : no supraclavicular adenopathy [No Cervical Adenopathy] : no cervical adenopathy [Examined in the supine and seated position] : examined in the supine and seated position [No dominant masses] : no dominant masses in right breast  [No dominant masses] : no dominant masses left breast [No Nipple Retraction] : no left nipple retraction [No Nipple Discharge] : no left nipple discharge [Breast Mass Right Breast ___cm] : no masses [Breast Mass Left Breast ___cm] : no masses [No Axillary Lymphadenopathy] : no left axillary lymphadenopathy [No Edema] : no edema [No Rashes] : no rashes [No Ulceration] : no ulceration [Breast Nipple Inversion] : nipples not inverted [Breast Nipple Retraction] : nipples not retracted [Breast Nipple Flattening] : nipples not flattened [Breast Nipple Fissures] : nipples not fissured [Breast Abnormal Lactation (Galactorrhea)] : no galactorrhea [Breast Abnormal Secretion Bloody Fluid] : no bloody discharge [Breast Abnormal Secretion Serous Fluid] : no serous discharge [Breast Abnormal Secretion Opalescent Fluid] : no milky discharge [de-identified] : On exam, the patient has ptotic C-cup breasts right breast is noticeably smaller than the left secondary to the partial mastectomy and radiation.  On palpation she has typical scarring changes toward the upper aspect of the right breast where she had her partial mastectomy but no evidence of recurrence.  She has no axillary, supraclavicular, or cervical adenopathy. [de-identified] : Status post right breast partial mastectomy with scarring changes in the right breast 11:00-12:00 region but no evidence of recurrence.

## 2024-05-22 NOTE — PAST MEDICAL HISTORY
[Postmenopausal] : The patient is postmenopausal [Menarche Age ____] : age at menarche was [unfilled] [Menopause Age____] : age at menopause was [unfilled] [Total Preg ___] : G[unfilled] [Live Births ___] : P[unfilled]  [Age At Live Birth ___] : Age at live birth: [unfilled] [History of Hormone Replacement Treatment] : has no history of hormone replacement treatment [de-identified] : bra size 42DD

## 2024-05-22 NOTE — HISTORY OF PRESENT ILLNESS
[FreeTextEntry1] : The patient is a 74-year-old G3, P2 postmenopausal white female of [place] descent.  She underwent menopause at age 57 and never took any hormone replacement therapy.  She underwent menarche at age 12 and had her first child at age 19.  She has a family history with her maternal aunts with breast cancer at age 70 and her father had brain and lung cancer at age 52.  The patient felt a mass in the right breast upper inner quadrant in October 2017 and underwent a mammography and ultrasound showing a suspicious spiculated mass measuring about 1.2 cm in the right breast 1:00 region 8 cm from the nipple.  She underwent an ultrasound-guided core biopsy on October 24, 2017 at [facility] which showed a 1 cm high-grade infiltrating ductal cancer which was ER positive at 90%, AK positive at 50%, HER-2/elissa negative by IHC with a Ki-67 of 20%.  She underwent a bilateral breast MRI on November 8, 2017 which showed a localized right breast cancer.  She underwent a right breast partial mastectomy and sentinel lymph node biopsy on December 11, 2017 and the final pathology showed 5 negative sentinel lymph nodes and the wide excision showed a 1.5 cm high-grade infiltrating ductal cancer with negative margins.  This was a pathologic prognostic stage IA breast cancer.  MammaPrint showed a high risk luminal type and she underwent AC-T chemotherapy with Dr. Stack which finished in May 2018.  She received external beam radiation which finished in July 2018 and was started on Arimidex in August 2018.  She comes in for routine follow-up and continues to get yearly mammography and ultrasound.

## 2024-05-22 NOTE — ASSESSMENT
[FreeTextEntry1] : The patient is a 74-year-old G3, P2 postmenopausal white female of [place] descent.  She underwent menopause at age 57 and never took any hormone replacement therapy.  She underwent menarche at age 12 and had her first child at age 19.  She has a family history with her maternal aunt with breast cancer at age 70 and her father had brain and lung cancer at age 52.  The patient felt a mass in the right breast upper inner quadrant in October 2017 and underwent a mammography and ultrasound showing a suspicious spiculated mass measuring about 1.2 cm in the right breast 1:00 region 8 cm from the nipple.  She underwent an ultrasound-guided core biopsy on October 24, 2017 at [facility] which showed a 1 cm high-grade infiltrating ductal cancer which was ER positive at 90%, MA positive at 50%, HER-2/elissa negative by IHC with a Ki-67 of 20%.  She underwent a bilateral breast MRI on November 8, 2017 which showed a localized right breast cancer.  She underwent a right breast partial mastectomy and sentinel lymph node biopsy on December 11, 2017 and the final pathology showed 5 negative sentinel lymph nodes and the wide excision showed a 1.5 cm high-grade infiltrating ductal cancer with negative margins.  This was a pathologic prognostic stage IA breast cancer.  MammaPrint showed a high risk luminal type and she underwent AC-T chemotherapy with Dr. Stack which finished in May 2018.  She received external beam radiation which finished in July 2018 and was started on Arimidex in August 2018.  On exam today, she has no evidence of recurrence in the right breast and no suspicious findings felt in the left breast.  She underwent her last bilateral mammography and ultrasound which was reviewed from May 8, 2024 performed at [facility] just showing postop changes to the right breast.  The patient was reassured and she can follow-up again in 1 year in May 2025.  She will remain on Arimidex and follow-up with Dr. Hu.  Her next bilateral mammography and ultrasound will be due in May 2025 and she was given prescriptions.

## 2025-02-17 ENCOUNTER — RX ONLY (RX ONLY)
Age: 75
End: 2025-02-17

## 2025-02-17 ENCOUNTER — OFFICE (OUTPATIENT)
Dept: URBAN - METROPOLITAN AREA CLINIC 122 | Facility: CLINIC | Age: 75
Setting detail: OPHTHALMOLOGY
End: 2025-02-17
Payer: MEDICARE

## 2025-02-17 DIAGNOSIS — H40.89: ICD-10-CM

## 2025-02-17 DIAGNOSIS — H34.8121: ICD-10-CM

## 2025-02-17 DIAGNOSIS — H25.13: ICD-10-CM

## 2025-02-17 DIAGNOSIS — H43.392: ICD-10-CM

## 2025-02-17 DIAGNOSIS — E11.3293: ICD-10-CM

## 2025-02-17 PROCEDURE — 99204 OFFICE O/P NEW MOD 45 MIN: CPT | Performed by: OPHTHALMOLOGY

## 2025-02-17 PROCEDURE — 92020 GONIOSCOPY: CPT | Performed by: OPHTHALMOLOGY

## 2025-02-17 PROCEDURE — 92083 EXTENDED VISUAL FIELD XM: CPT | Performed by: OPHTHALMOLOGY

## 2025-02-17 PROCEDURE — 76514 ECHO EXAM OF EYE THICKNESS: CPT | Performed by: OPHTHALMOLOGY

## 2025-02-17 PROCEDURE — 92133 CPTRZD OPH DX IMG PST SGM ON: CPT | Performed by: OPHTHALMOLOGY

## 2025-02-17 ASSESSMENT — PACHYMETRY
OS_CT_UM: 562
OD_CT_CORRECTION: -1
OD_CT_UM: 562
OS_CT_CORRECTION: -1

## 2025-02-17 ASSESSMENT — VISUAL ACUITY
OS_BCVA: 20/25+2
OD_BCVA: CF 3FT

## 2025-02-17 ASSESSMENT — REFRACTION_CURRENTRX
OS_AXIS: 106
OD_SPHERE: +2.00
OS_SPHERE: +1.00
OD_CYLINDER: -0.75
OS_CYLINDER: -1.00
OD_OVR_VA: 20/
OS_OVR_VA: 20/
OD_AXIS: 108

## 2025-02-17 ASSESSMENT — CONFRONTATIONAL VISUAL FIELD TEST (CVF)
OS_FINDINGS: FULL
OD_FINDINGS: FULL

## 2025-02-17 ASSESSMENT — TONOMETRY: OD_IOP_MMHG: 14

## 2025-03-13 ENCOUNTER — OFFICE (OUTPATIENT)
Dept: URBAN - METROPOLITAN AREA CLINIC 122 | Facility: CLINIC | Age: 75
Setting detail: OPHTHALMOLOGY
End: 2025-03-13
Payer: MEDICARE

## 2025-03-13 DIAGNOSIS — H40.89: ICD-10-CM

## 2025-03-13 DIAGNOSIS — E11.3293: ICD-10-CM

## 2025-03-13 DIAGNOSIS — H25.13: ICD-10-CM

## 2025-03-13 DIAGNOSIS — H34.8121: ICD-10-CM

## 2025-03-13 PROCEDURE — 99214 OFFICE O/P EST MOD 30 MIN: CPT | Performed by: OPHTHALMOLOGY

## 2025-03-13 ASSESSMENT — REFRACTION_CURRENTRX
OS_AXIS: 106
OD_SPHERE: +2.00
OD_OVR_VA: 20/
OS_CYLINDER: -1.00
OD_CYLINDER: -0.75
OS_OVR_VA: 20/
OD_AXIS: 108
OS_SPHERE: +1.00

## 2025-03-13 ASSESSMENT — VISUAL ACUITY
OS_BCVA: 20/25
OD_BCVA: CF 3FT

## 2025-03-13 ASSESSMENT — PACHYMETRY
OD_CT_CORRECTION: -1
OS_CT_CORRECTION: -1
OD_CT_UM: 562
OS_CT_UM: 562

## 2025-03-13 ASSESSMENT — TONOMETRY: OD_IOP_MMHG: 12

## 2025-03-18 ENCOUNTER — AMBULATORY SURGERY CENTER (OUTPATIENT)
Dept: URBAN - METROPOLITAN AREA SURGERY 17 | Facility: SURGERY | Age: 75
Setting detail: OPHTHALMOLOGY
End: 2025-03-18
Payer: MEDICARE

## 2025-03-18 DIAGNOSIS — H40.89: ICD-10-CM

## 2025-03-18 PROCEDURE — 66710 CILIARY TRANSSLERAL THERAPY: CPT | Mod: LT | Performed by: OPHTHALMOLOGY

## 2025-03-19 ENCOUNTER — OFFICE (OUTPATIENT)
Dept: URBAN - METROPOLITAN AREA CLINIC 122 | Facility: CLINIC | Age: 75
Setting detail: OPHTHALMOLOGY
End: 2025-03-19
Payer: MEDICARE

## 2025-03-19 DIAGNOSIS — H18.70: ICD-10-CM

## 2025-03-19 DIAGNOSIS — H40.89: ICD-10-CM

## 2025-03-19 PROCEDURE — 92071 CONTACT LENS FITTING FOR TX: CPT | Mod: 58,LT | Performed by: OPHTHALMOLOGY

## 2025-03-19 PROCEDURE — 99024 POSTOP FOLLOW-UP VISIT: CPT | Mod: 25 | Performed by: OPHTHALMOLOGY

## 2025-03-19 ASSESSMENT — PACHYMETRY
OD_CT_CORRECTION: -1
OS_CT_CORRECTION: -1
OD_CT_UM: 562
OS_CT_UM: 562

## 2025-03-19 ASSESSMENT — REFRACTION_CURRENTRX
OS_SPHERE: +1.00
OD_AXIS: 108
OS_OVR_VA: 20/
OD_CYLINDER: -0.75
OS_AXIS: 106
OS_CYLINDER: -1.00
OD_SPHERE: +2.00
OD_OVR_VA: 20/

## 2025-03-19 ASSESSMENT — VISUAL ACUITY
OS_BCVA: 20/25
OD_BCVA: CF 3FT

## 2025-03-19 ASSESSMENT — CORNEAL EDEMA - MICROCYSTIC EPITHELIAL EDEMA (MCE): OS_MCE: 2+

## 2025-04-04 ENCOUNTER — OFFICE (OUTPATIENT)
Dept: URBAN - METROPOLITAN AREA CLINIC 122 | Facility: CLINIC | Age: 75
Setting detail: OPHTHALMOLOGY
End: 2025-04-04
Payer: MEDICARE

## 2025-04-04 ENCOUNTER — RX ONLY (RX ONLY)
Age: 75
End: 2025-04-04

## 2025-04-04 DIAGNOSIS — H40.89: ICD-10-CM

## 2025-04-04 DIAGNOSIS — H18.70: ICD-10-CM

## 2025-04-04 PROCEDURE — 65222 REMOVE FOREIGN BODY FROM EYE: CPT | Mod: 78,LT | Performed by: OPHTHALMOLOGY

## 2025-04-04 PROCEDURE — 99024 POSTOP FOLLOW-UP VISIT: CPT | Mod: 25 | Performed by: OPHTHALMOLOGY

## 2025-04-04 ASSESSMENT — CORNEAL EDEMA - MICROCYSTIC EPITHELIAL EDEMA (MCE): OS_MCE: ABSENT

## 2025-04-04 ASSESSMENT — PACHYMETRY
OD_CT_CORRECTION: -1
OS_CT_UM: 562
OD_CT_UM: 562
OS_CT_CORRECTION: -1

## 2025-04-04 ASSESSMENT — REFRACTION_CURRENTRX
OS_CYLINDER: -1.00
OS_SPHERE: +1.00
OS_OVR_VA: 20/
OD_AXIS: 108
OS_AXIS: 106
OD_SPHERE: +2.00
OD_CYLINDER: -0.75
OD_OVR_VA: 20/

## 2025-04-04 ASSESSMENT — VISUAL ACUITY
OD_BCVA: CF 3FT
OS_BCVA: 20/20-1

## 2025-05-13 ENCOUNTER — RESULT REVIEW (OUTPATIENT)
Age: 75
End: 2025-05-13

## 2025-05-13 ENCOUNTER — NON-APPOINTMENT (OUTPATIENT)
Age: 75
End: 2025-05-13

## 2025-05-13 ENCOUNTER — APPOINTMENT (OUTPATIENT)
Dept: BREAST CENTER | Facility: CLINIC | Age: 75
End: 2025-05-13
Payer: MEDICARE

## 2025-05-13 VITALS
HEIGHT: 58 IN | BODY MASS INDEX: 31.91 KG/M2 | HEART RATE: 55 BPM | SYSTOLIC BLOOD PRESSURE: 150 MMHG | DIASTOLIC BLOOD PRESSURE: 80 MMHG | WEIGHT: 152 LBS | OXYGEN SATURATION: 98 %

## 2025-05-13 DIAGNOSIS — R92.30 DENSE BREASTS, UNSPECIFIED: ICD-10-CM

## 2025-05-13 DIAGNOSIS — Z12.31 ENCOUNTER FOR SCREENING MAMMOGRAM FOR MALIGNANT NEOPLASM OF BREAST: ICD-10-CM

## 2025-05-13 DIAGNOSIS — Z90.11 ACQUIRED ABSENCE OF RIGHT BREAST AND NIPPLE: ICD-10-CM

## 2025-05-13 DIAGNOSIS — Z80.3 FAMILY HISTORY OF MALIGNANT NEOPLASM OF BREAST: ICD-10-CM

## 2025-05-13 DIAGNOSIS — N60.19 DIFFUSE CYSTIC MASTOPATHY OF UNSPECIFIED BREAST: ICD-10-CM

## 2025-05-13 DIAGNOSIS — Z85.3 PERSONAL HISTORY OF MALIGNANT NEOPLASM OF BREAST: ICD-10-CM

## 2025-05-13 PROCEDURE — G2211 COMPLEX E/M VISIT ADD ON: CPT

## 2025-05-13 PROCEDURE — 99213 OFFICE O/P EST LOW 20 MIN: CPT

## 2025-05-16 ENCOUNTER — OFFICE (OUTPATIENT)
Dept: URBAN - METROPOLITAN AREA CLINIC 122 | Facility: CLINIC | Age: 75
Setting detail: OPHTHALMOLOGY
End: 2025-05-16
Payer: MEDICARE

## 2025-05-16 DIAGNOSIS — H40.89: ICD-10-CM

## 2025-05-16 DIAGNOSIS — H18.70: ICD-10-CM

## 2025-05-16 PROCEDURE — 99024 POSTOP FOLLOW-UP VISIT: CPT | Performed by: OPHTHALMOLOGY

## 2025-05-16 ASSESSMENT — REFRACTION_CURRENTRX
OS_CYLINDER: -1.00
OS_SPHERE: +1.00
OD_OVR_VA: 20/
OD_CYLINDER: -0.75
OD_AXIS: 108
OS_OVR_VA: 20/
OS_AXIS: 106
OD_SPHERE: +2.00

## 2025-05-16 ASSESSMENT — TONOMETRY
OS_IOP_MMHG: 18
OD_IOP_MMHG: 16

## 2025-05-16 ASSESSMENT — PACHYMETRY
OD_CT_CORRECTION: -1
OD_CT_UM: 562
OS_CT_UM: 562
OS_CT_CORRECTION: -1

## 2025-05-16 ASSESSMENT — CORNEAL EDEMA - MICROCYSTIC EPITHELIAL EDEMA (MCE): OS_MCE: ABSENT

## 2025-05-16 ASSESSMENT — VISUAL ACUITY
OS_BCVA: 20/20-1
OD_BCVA: CF 3FT

## 2025-07-16 ENCOUNTER — OFFICE (OUTPATIENT)
Dept: URBAN - METROPOLITAN AREA CLINIC 122 | Facility: CLINIC | Age: 75
Setting detail: OPHTHALMOLOGY
End: 2025-07-16
Payer: MEDICARE

## 2025-07-16 DIAGNOSIS — H34.8121: ICD-10-CM

## 2025-07-16 DIAGNOSIS — H43.392: ICD-10-CM

## 2025-07-16 DIAGNOSIS — H25.13: ICD-10-CM

## 2025-07-16 DIAGNOSIS — E11.3293: ICD-10-CM

## 2025-07-16 DIAGNOSIS — H40.89: ICD-10-CM

## 2025-07-16 DIAGNOSIS — H18.70: ICD-10-CM

## 2025-07-16 PROCEDURE — 92250 FUNDUS PHOTOGRAPHY W/I&R: CPT | Performed by: OPHTHALMOLOGY

## 2025-07-16 PROCEDURE — 92014 COMPRE OPH EXAM EST PT 1/>: CPT | Performed by: OPHTHALMOLOGY

## 2025-07-16 ASSESSMENT — CONFRONTATIONAL VISUAL FIELD TEST (CVF)
OD_FINDINGS: FULL
OS_FINDINGS: FULL

## 2025-07-16 ASSESSMENT — REFRACTION_CURRENTRX
OS_SPHERE: +1.00
OS_CYLINDER: -1.00
OS_OVR_VA: 20/
OS_AXIS: 106
OD_CYLINDER: -0.75
OD_SPHERE: +2.00
OD_ADD: +2.50
OD_OVR_VA: 20/
OS_ADD: +2.50
OD_AXIS: 108

## 2025-07-16 ASSESSMENT — REFRACTION_AUTOREFRACTION
OD_CYLINDER: -0.75
OS_CYLINDER: -2.00
OS_AXIS: 83
OD_AXIS: 65
OD_SPHERE: +0.75
OS_SPHERE: -0.25

## 2025-07-16 ASSESSMENT — VISUAL ACUITY
OD_BCVA: CF 5FT
OS_BCVA: 20/25

## 2025-07-16 ASSESSMENT — TONOMETRY
OD_IOP_MMHG: 16
OS_IOP_MMHG: 21

## 2025-07-16 ASSESSMENT — CORNEAL EDEMA - MICROCYSTIC EPITHELIAL EDEMA (MCE): OS_MCE: ABSENT

## 2025-07-16 ASSESSMENT — PACHYMETRY
OD_CT_CORRECTION: -1
OS_CT_UM: 562
OS_CT_CORRECTION: -1
OD_CT_UM: 562